# Patient Record
Sex: FEMALE | Race: OTHER | Employment: OTHER | URBAN - METROPOLITAN AREA
[De-identification: names, ages, dates, MRNs, and addresses within clinical notes are randomized per-mention and may not be internally consistent; named-entity substitution may affect disease eponyms.]

---

## 2019-10-07 NOTE — PATIENT DISCUSSION
New Prescription: TobraDex (tobramycin-dexamethasone): ointment: 0.3-0.1% 1 a small amount at bedtime as directed into both eyes 10-

## 2019-10-07 NOTE — PATIENT DISCUSSION
CHALAZION OD: PRESCRIBED WARM COMPRESSES, EYELID SCRUBS AND DOXYCYCLINE 100MG BID PO X 2 WEEKS THEN QDAY X 1 WEEK AND TOBRADEX YANDEL QHS X 2 WEEKS.

## 2019-10-07 NOTE — PATIENT DISCUSSION
New Prescription: doxycycline hyclate (doxycycline hyclate): tablet: 100 mg 1 tablet twice a day as directed by mouth 10-

## 2020-03-03 NOTE — PATIENT DISCUSSION
New Prescription: erythromycin (erythromycin): ointment: 5 mg/gram (0.5 %) a small amount every night as directed into both eyes 03-

## 2020-07-10 ENCOUNTER — ESTABLISHED COMPREHENSIVE EXAM (OUTPATIENT)
Dept: URBAN - METROPOLITAN AREA CLINIC 39 | Facility: CLINIC | Age: 60
End: 2020-07-10

## 2020-07-10 DIAGNOSIS — H25.811: ICD-10-CM

## 2020-07-10 DIAGNOSIS — H25.812: ICD-10-CM

## 2020-07-10 DIAGNOSIS — H04.123: ICD-10-CM

## 2020-07-10 DIAGNOSIS — H52.203: ICD-10-CM

## 2020-07-10 DIAGNOSIS — H52.03: ICD-10-CM

## 2020-07-10 DIAGNOSIS — H43.812: ICD-10-CM

## 2020-07-10 DIAGNOSIS — H52.4: ICD-10-CM

## 2020-07-10 PROCEDURE — 92015 DETERMINE REFRACTIVE STATE: CPT

## 2020-07-10 PROCEDURE — 92014 COMPRE OPH EXAM EST PT 1/>: CPT

## 2020-07-10 ASSESSMENT — VISUAL ACUITY
OU_CC: J5
OS_SC: <J12
OS_BAT: 20/70-1 W/MR
OD_CC: 20/30-1
OS_SC: 20/200
OD_SC: 20/200
OU_SC: <J12
OD_SC: <J12
OU_SC: 20/100
OD_CC: J2
OU_CC: 20/30
OS_CC: 20/40+2
OS_CC: J3

## 2020-07-10 ASSESSMENT — KERATOMETRY
OS_K1POWER_DIOPTERS: 46
OD_K2POWER_DIOPTERS: 46.75
OS_AXISANGLE_DEGREES: 20
OD_K1POWER_DIOPTERS: 46.5
OD_AXISANGLE_DEGREES: 150
OS_K2POWER_DIOPTERS: 46.5
OS_AXISANGLE2_DEGREES: 110
OD_AXISANGLE2_DEGREES: 60

## 2020-07-10 ASSESSMENT — TONOMETRY
OD_IOP_MMHG: 12
OS_IOP_MMHG: 12

## 2021-07-14 ENCOUNTER — ESTABLISHED COMPREHENSIVE EXAM (OUTPATIENT)
Dept: URBAN - METROPOLITAN AREA CLINIC 39 | Facility: CLINIC | Age: 61
End: 2021-07-14

## 2021-07-14 DIAGNOSIS — H25.811: ICD-10-CM

## 2021-07-14 DIAGNOSIS — H52.4: ICD-10-CM

## 2021-07-14 DIAGNOSIS — H52.03: ICD-10-CM

## 2021-07-14 DIAGNOSIS — H04.123: ICD-10-CM

## 2021-07-14 DIAGNOSIS — H02.831: ICD-10-CM

## 2021-07-14 DIAGNOSIS — H25.812: ICD-10-CM

## 2021-07-14 DIAGNOSIS — H43.812: ICD-10-CM

## 2021-07-14 DIAGNOSIS — H52.203: ICD-10-CM

## 2021-07-14 DIAGNOSIS — H02.834: ICD-10-CM

## 2021-07-14 PROCEDURE — 92015 DETERMINE REFRACTIVE STATE: CPT

## 2021-07-14 PROCEDURE — 92014 COMPRE OPH EXAM EST PT 1/>: CPT

## 2021-07-14 ASSESSMENT — TONOMETRY
OS_IOP_MMHG: 16
OD_IOP_MMHG: 15

## 2021-07-14 ASSESSMENT — VISUAL ACUITY
OS_BAT: 20/40
OU_CC: 20/30-1
OS_SC: 20/400+1
OD_SC: 20/400+1
OS_CC: J2
OD_CC: J2
OD_SC: <J12
OD_CC: 20/30-1
OU_CC: J2
OS_CC: 20/30-2
OD_BAT: 20/40
OU_SC: <J12
OS_SC: <J12
OU_SC: 20/400+1

## 2021-07-14 ASSESSMENT — KERATOMETRY
OS_K2POWER_DIOPTERS: 46.5
OS_AXISANGLE2_DEGREES: 110
OD_K1POWER_DIOPTERS: 46.5
OD_K2POWER_DIOPTERS: 46.75
OS_AXISANGLE_DEGREES: 20
OD_AXISANGLE2_DEGREES: 60
OD_AXISANGLE_DEGREES: 150
OS_K1POWER_DIOPTERS: 46

## 2021-08-06 ENCOUNTER — CATARACT EVALUATION (OUTPATIENT)
Dept: URBAN - METROPOLITAN AREA CLINIC 39 | Facility: CLINIC | Age: 61
End: 2021-08-06

## 2021-08-06 DIAGNOSIS — H25.812: ICD-10-CM

## 2021-08-06 DIAGNOSIS — H25.811: ICD-10-CM

## 2021-08-06 DIAGNOSIS — H18.513: ICD-10-CM

## 2021-08-06 DIAGNOSIS — H02.831: ICD-10-CM

## 2021-08-06 DIAGNOSIS — H02.834: ICD-10-CM

## 2021-08-06 DIAGNOSIS — H43.812: ICD-10-CM

## 2021-08-06 DIAGNOSIS — H04.123: ICD-10-CM

## 2021-08-06 PROCEDURE — 92025-2 CORNEAL TOPOGRAPHY, PT

## 2021-08-06 PROCEDURE — 92286 ANT SGM IMG I&R SPECLR MIC: CPT

## 2021-08-06 PROCEDURE — 92136TC INTERFEROMETRY - TECHNICAL COMPONENT

## 2021-08-06 PROCEDURE — 99214 OFFICE O/P EST MOD 30 MIN: CPT

## 2021-08-06 PROCEDURE — 92134 CPTRZ OPH DX IMG PST SGM RTA: CPT

## 2021-08-06 RX ORDER — DUREZOL 0.5 MG/ML: 1 EMULSION OPHTHALMIC TWICE A DAY

## 2021-08-06 RX ORDER — NEOMYCIN SULFATE, POLYMYXIN B SULFATE AND DEXAMETHASONE 3.5; 10000; 1 MG/G; [USP'U]/G; MG/G: OINTMENT OPHTHALMIC

## 2021-08-06 RX ORDER — AMOXICILLIN 500 MG/1: 1 CAPSULE ORAL

## 2021-08-06 RX ORDER — KETOROLAC TROMETHAMINE 5 MG/ML: 1 SOLUTION OPHTHALMIC

## 2021-08-06 ASSESSMENT — VISUAL ACUITY
OD_RAM: 20/25-2
OS_SC: <J12
OD_CC: J3
OD_SC: 20/400
OS_AM: 20/25-1
OS_SC: 20/400
OS_CC: J3
OS_CC: 20/30-1
OD_BAT: 20/60
OD_SC: <J12
OD_CC: 20/30+1
OS_BAT: 20/60

## 2021-08-06 ASSESSMENT — TONOMETRY
OS_IOP_MMHG: 15
OD_IOP_MMHG: 15

## 2021-08-13 ENCOUNTER — FOLLOW UP (OUTPATIENT)
Dept: URBAN - METROPOLITAN AREA CLINIC 39 | Facility: CLINIC | Age: 61
End: 2021-08-13

## 2021-08-13 DIAGNOSIS — H01.003: ICD-10-CM

## 2021-08-13 DIAGNOSIS — H01.006: ICD-10-CM

## 2021-08-13 PROCEDURE — 92012 INTRM OPH EXAM EST PATIENT: CPT

## 2021-08-13 ASSESSMENT — TONOMETRY
OD_IOP_MMHG: 14
OS_IOP_MMHG: 14

## 2021-08-13 ASSESSMENT — VISUAL ACUITY
OS_CC: 20/50
OD_CC: 20/30

## 2021-08-25 ENCOUNTER — PRE-OP/H&P (OUTPATIENT)
Dept: URBAN - METROPOLITAN AREA CLINIC 39 | Facility: CLINIC | Age: 61
End: 2021-08-25

## 2021-08-25 ENCOUNTER — SURGERY/PROCEDURE (OUTPATIENT)
Dept: URBAN - METROPOLITAN AREA CLINIC 39 | Facility: CLINIC | Age: 61
End: 2021-08-25

## 2021-08-25 DIAGNOSIS — H25.812: ICD-10-CM

## 2021-08-25 DIAGNOSIS — H25.811: ICD-10-CM

## 2021-08-25 PROCEDURE — 66984CV REMOVE CATARACT, INSERT LENS, CUSTOM VISION

## 2021-08-25 PROCEDURE — 99211T TECH SERVICE

## 2021-08-25 PROCEDURE — 66999LNSR LENSAR LASER FOR CAT SX

## 2021-08-25 PROCEDURE — 65772LRI LRI DURING CAT SX

## 2021-08-25 RX ORDER — PREDNISOLONE ACETATE 10 MG/ML: 1 SUSPENSION/ DROPS OPHTHALMIC

## 2021-08-25 RX ORDER — KETOROLAC TROMETHAMINE 5 MG/ML: 1 SOLUTION OPHTHALMIC

## 2021-08-25 RX ORDER — AMOXICILLIN 500 MG/1: 1 CAPSULE ORAL

## 2021-08-26 ENCOUNTER — POST OP/EVAL OF SECOND EYE (OUTPATIENT)
Dept: URBAN - METROPOLITAN AREA CLINIC 39 | Facility: CLINIC | Age: 61
End: 2021-08-26

## 2021-08-26 DIAGNOSIS — H25.811: ICD-10-CM

## 2021-08-26 DIAGNOSIS — Z96.1: ICD-10-CM

## 2021-08-26 PROCEDURE — 92012 INTRM OPH EXAM EST PATIENT: CPT

## 2021-08-26 PROCEDURE — 99024 POSTOP FOLLOW-UP VISIT: CPT

## 2021-08-26 ASSESSMENT — VISUAL ACUITY
OS_SC: J8
OD_BAT: 20/60
OS_SC: 20/30+2

## 2021-08-26 ASSESSMENT — TONOMETRY
OD_IOP_MMHG: 14
OS_IOP_MMHG: 14

## 2021-09-01 ENCOUNTER — PRE-OP/H&P (OUTPATIENT)
Dept: URBAN - METROPOLITAN AREA CLINIC 39 | Facility: CLINIC | Age: 61
End: 2021-09-01

## 2021-09-01 ENCOUNTER — SURGERY/PROCEDURE (OUTPATIENT)
Dept: URBAN - METROPOLITAN AREA CLINIC 39 | Facility: CLINIC | Age: 61
End: 2021-09-01

## 2021-09-01 DIAGNOSIS — Z96.1: ICD-10-CM

## 2021-09-01 DIAGNOSIS — H25.811: ICD-10-CM

## 2021-09-01 PROCEDURE — 66984CV REMOVE CATARACT, INSERT LENS, CUSTOM VISION

## 2021-09-01 PROCEDURE — 66999LNSR LENSAR LASER FOR CAT SX

## 2021-09-01 PROCEDURE — 99211T TECH SERVICE

## 2021-09-01 PROCEDURE — 65772LRI LRI DURING CAT SX

## 2021-09-01 ASSESSMENT — VISUAL ACUITY
OS_SC: 20/25
OD_SC: 20/80
OS_SC: J12
OD_SC: <J12

## 2021-09-01 ASSESSMENT — TONOMETRY
OS_IOP_MMHG: 15
OD_IOP_MMHG: 14

## 2021-09-02 ENCOUNTER — CATARACT POST-OP 1-DAY (OUTPATIENT)
Dept: URBAN - METROPOLITAN AREA CLINIC 39 | Facility: CLINIC | Age: 61
End: 2021-09-02

## 2021-09-02 DIAGNOSIS — Z96.1: ICD-10-CM

## 2021-09-02 PROCEDURE — 99024 POSTOP FOLLOW-UP VISIT: CPT

## 2021-09-02 ASSESSMENT — TONOMETRY
OD_IOP_MMHG: 16
OS_IOP_MMHG: 16

## 2021-09-02 ASSESSMENT — VISUAL ACUITY
OD_SC: J10
OS_SC: 20/25
OD_SC: 20/30-1
OU_SC: 20/25
OS_SC: J10

## 2021-09-30 ENCOUNTER — PREPPED CHART (OUTPATIENT)
Dept: URBAN - METROPOLITAN AREA CLINIC 39 | Facility: CLINIC | Age: 61
End: 2021-09-30

## 2021-10-06 ENCOUNTER — POST-OP CATARACT (OUTPATIENT)
Dept: URBAN - METROPOLITAN AREA CLINIC 39 | Facility: CLINIC | Age: 61
End: 2021-10-06

## 2021-10-06 DIAGNOSIS — Z96.1: ICD-10-CM

## 2021-10-06 PROCEDURE — 99024 POSTOP FOLLOW-UP VISIT: CPT

## 2021-10-06 ASSESSMENT — VISUAL ACUITY
OD_SC: 20/30
OS_SC: J8
OD_SC: J5
OS_SC: 20/20

## 2021-10-06 ASSESSMENT — TONOMETRY
OD_IOP_MMHG: 14
OS_IOP_MMHG: 15

## 2022-02-07 NOTE — PATIENT DISCUSSION
PRESCRIBE WARM COMPRESSES AND EYELID SCRUBS QD-BID, ARTIFICIAL TEARS BID-QID AND THE DAILY INTAKE OF OMEGA-3 FATTY ACIDS. CONSIDER TOPICAL STEROID, ORAL TETRACYCLINE AND/OR LIPIFLOW FOR EXACERBATIONS. RETURN FOR FOLLOW-UP AS SCHEDULED.

## 2022-07-08 ENCOUNTER — PREPPED CHART (OUTPATIENT)
Dept: URBAN - METROPOLITAN AREA CLINIC 39 | Facility: CLINIC | Age: 62
End: 2022-07-08

## 2024-01-16 ENCOUNTER — COMPREHENSIVE EXAM (OUTPATIENT)
Dept: URBAN - METROPOLITAN AREA CLINIC 39 | Facility: CLINIC | Age: 64
End: 2024-01-16

## 2024-01-16 DIAGNOSIS — H02.831: ICD-10-CM

## 2024-01-16 DIAGNOSIS — H01.006: ICD-10-CM

## 2024-01-16 DIAGNOSIS — H18.513: ICD-10-CM

## 2024-01-16 DIAGNOSIS — H02.834: ICD-10-CM

## 2024-01-16 DIAGNOSIS — H01.003: ICD-10-CM

## 2024-01-16 DIAGNOSIS — H43.813: ICD-10-CM

## 2024-01-16 DIAGNOSIS — H26.493: ICD-10-CM

## 2024-01-16 DIAGNOSIS — H52.03: ICD-10-CM

## 2024-01-16 DIAGNOSIS — H52.223: ICD-10-CM

## 2024-01-16 PROCEDURE — 92015 DETERMINE REFRACTIVE STATE: CPT

## 2024-01-16 PROCEDURE — 99214 OFFICE O/P EST MOD 30 MIN: CPT

## 2024-01-16 ASSESSMENT — VISUAL ACUITY
OS_SC: 20/30
OD_SC: 20/100
OS_SC: J16
OD_SC: J10
OU_SC: J10

## 2024-01-16 ASSESSMENT — TONOMETRY
OD_IOP_MMHG: 11
OS_IOP_MMHG: 11

## 2024-02-05 ENCOUNTER — SURGERY/PROCEDURE (OUTPATIENT)
Dept: URBAN - METROPOLITAN AREA SURGERY 14 | Facility: SURGERY | Age: 64
End: 2024-02-05

## 2024-02-05 ENCOUNTER — CONSULTATION/EVALUATION (OUTPATIENT)
Dept: URBAN - METROPOLITAN AREA CLINIC 39 | Facility: CLINIC | Age: 64
End: 2024-02-05

## 2024-02-05 DIAGNOSIS — H26.491: ICD-10-CM

## 2024-02-05 DIAGNOSIS — H26.493: ICD-10-CM

## 2024-02-05 PROCEDURE — 66821 AFTER CATARACT LASER SURGERY: CPT

## 2024-02-05 PROCEDURE — 92014 COMPRE OPH EXAM EST PT 1/>: CPT

## 2024-02-05 ASSESSMENT — TONOMETRY
OS_IOP_MMHG: 11
OD_IOP_MMHG: 11

## 2024-02-05 ASSESSMENT — VISUAL ACUITY
OD_SC: 20/80+1
OS_BAT: 20/20
OS_SC: 20/20
OS_SC: J16
OD_SC: J10

## 2024-02-13 ENCOUNTER — POST-OP (OUTPATIENT)
Dept: URBAN - METROPOLITAN AREA CLINIC 39 | Facility: CLINIC | Age: 64
End: 2024-02-13

## 2024-02-13 DIAGNOSIS — Z98.890: ICD-10-CM

## 2024-02-13 PROCEDURE — 99024 POSTOP FOLLOW-UP VISIT: CPT

## 2024-02-13 ASSESSMENT — TONOMETRY
OS_IOP_MMHG: 10
OD_IOP_MMHG: 10

## 2024-02-13 ASSESSMENT — VISUAL ACUITY
OU_SC: J12
OD_SC: J12
OU_CC: J1
OU_SC: 20/25-2
OS_SC: 20/25-2
OS_CC: J1
OD_CC: J1
OD_SC: 20/30+1
OS_SC: J12

## 2025-02-13 ENCOUNTER — COMPREHENSIVE EXAM (OUTPATIENT)
Age: 65
End: 2025-02-13

## 2025-02-13 DIAGNOSIS — H18.513: ICD-10-CM

## 2025-02-13 DIAGNOSIS — H26.492: ICD-10-CM

## 2025-02-13 DIAGNOSIS — H02.831: ICD-10-CM

## 2025-02-13 DIAGNOSIS — H52.03: ICD-10-CM

## 2025-02-13 DIAGNOSIS — H02.123: ICD-10-CM

## 2025-02-13 DIAGNOSIS — H02.834: ICD-10-CM

## 2025-02-13 DIAGNOSIS — H52.223: ICD-10-CM

## 2025-02-13 DIAGNOSIS — H01.003: ICD-10-CM

## 2025-02-13 DIAGNOSIS — H43.813: ICD-10-CM

## 2025-02-13 DIAGNOSIS — H01.006: ICD-10-CM

## 2025-02-13 DIAGNOSIS — H02.126: ICD-10-CM

## 2025-02-13 PROCEDURE — 92015 DETERMINE REFRACTIVE STATE: CPT

## 2025-02-13 PROCEDURE — 99214 OFFICE O/P EST MOD 30 MIN: CPT

## 2025-04-10 ENCOUNTER — FOLLOW UP (OUTPATIENT)
Age: 65
End: 2025-04-10

## 2025-04-10 DIAGNOSIS — H01.003: ICD-10-CM

## 2025-04-10 DIAGNOSIS — H01.006: ICD-10-CM

## 2025-04-10 PROCEDURE — 92012 INTRM OPH EXAM EST PATIENT: CPT

## 2025-06-05 ENCOUNTER — CONSULTATION/EVALUATION (OUTPATIENT)
Age: 65
End: 2025-06-05

## 2025-06-05 DIAGNOSIS — H01.116: ICD-10-CM

## 2025-06-05 DIAGNOSIS — H01.113: ICD-10-CM

## 2025-06-05 DIAGNOSIS — H02.136: ICD-10-CM

## 2025-06-05 DIAGNOSIS — H02.133: ICD-10-CM

## 2025-06-05 PROCEDURE — 99213 OFFICE O/P EST LOW 20 MIN: CPT

## 2025-06-05 PROCEDURE — 92285 EXTERNAL OCULAR PHOTOGRAPHY: CPT

## 2025-06-23 ENCOUNTER — FOLLOW UP (OUTPATIENT)
Age: 65
End: 2025-06-23

## 2025-06-23 DIAGNOSIS — H01.113: ICD-10-CM

## 2025-06-23 DIAGNOSIS — H01.116: ICD-10-CM

## 2025-06-23 DIAGNOSIS — H02.136: ICD-10-CM

## 2025-06-23 DIAGNOSIS — H02.133: ICD-10-CM

## 2025-06-23 PROCEDURE — 99213 OFFICE O/P EST LOW 20 MIN: CPT

## 2025-06-23 PROCEDURE — 92285 EXTERNAL OCULAR PHOTOGRAPHY: CPT
